# Patient Record
Sex: MALE | Race: BLACK OR AFRICAN AMERICAN | NOT HISPANIC OR LATINO | Employment: FULL TIME | URBAN - METROPOLITAN AREA
[De-identification: names, ages, dates, MRNs, and addresses within clinical notes are randomized per-mention and may not be internally consistent; named-entity substitution may affect disease eponyms.]

---

## 2024-01-09 DIAGNOSIS — E10.65 TYPE 1 DIABETES MELLITUS WITH HYPERGLYCEMIA (MULTI): ICD-10-CM

## 2024-01-10 RX ORDER — INSULIN LISPRO 100 [IU]/ML
INJECTION, SOLUTION INTRAVENOUS; SUBCUTANEOUS
Qty: 36 ML | Refills: 1 | Status: SHIPPED | OUTPATIENT
Start: 2024-01-10 | End: 2024-03-29 | Stop reason: SDUPTHER

## 2024-01-28 ENCOUNTER — CLINICAL SUPPORT (OUTPATIENT)
Dept: EMERGENCY MEDICINE | Facility: HOSPITAL | Age: 24
End: 2024-01-28
Payer: COMMERCIAL

## 2024-01-28 ENCOUNTER — HOSPITAL ENCOUNTER (EMERGENCY)
Facility: HOSPITAL | Age: 24
Discharge: HOME | End: 2024-01-28
Attending: EMERGENCY MEDICINE
Payer: COMMERCIAL

## 2024-01-28 ENCOUNTER — APPOINTMENT (OUTPATIENT)
Dept: RADIOLOGY | Facility: HOSPITAL | Age: 24
End: 2024-01-28
Payer: COMMERCIAL

## 2024-01-28 VITALS
SYSTOLIC BLOOD PRESSURE: 131 MMHG | HEIGHT: 74 IN | DIASTOLIC BLOOD PRESSURE: 81 MMHG | BODY MASS INDEX: 21.17 KG/M2 | WEIGHT: 165 LBS | HEART RATE: 77 BPM | OXYGEN SATURATION: 100 % | RESPIRATION RATE: 16 BRPM

## 2024-01-28 DIAGNOSIS — R07.89 CHEST DISCOMFORT: Primary | ICD-10-CM

## 2024-01-28 LAB
ALBUMIN SERPL BCP-MCNC: 3.7 G/DL (ref 3.4–5)
ALP SERPL-CCNC: 63 U/L (ref 33–120)
ALT SERPL W P-5'-P-CCNC: 11 U/L (ref 10–52)
ANION GAP BLDV CALCULATED.4IONS-SCNC: 7 MMOL/L (ref 10–25)
ANION GAP SERPL CALC-SCNC: 11 MMOL/L (ref 10–20)
AST SERPL W P-5'-P-CCNC: 18 U/L (ref 9–39)
ATRIAL RATE: 66 BPM
BASE EXCESS BLDV CALC-SCNC: 5.3 MMOL/L (ref -2–3)
BASOPHILS # BLD AUTO: 0.02 X10*3/UL (ref 0–0.1)
BASOPHILS NFR BLD AUTO: 0.7 %
BILIRUB SERPL-MCNC: 0.6 MG/DL (ref 0–1.2)
BODY TEMPERATURE: 37 DEGREES CELSIUS
BUN SERPL-MCNC: 15 MG/DL (ref 6–23)
CA-I BLDV-SCNC: 1.2 MMOL/L (ref 1.1–1.33)
CALCIUM SERPL-MCNC: 9.2 MG/DL (ref 8.6–10.6)
CARDIAC TROPONIN I PNL SERPL HS: <3 NG/L (ref 0–53)
CHLORIDE BLDV-SCNC: 104 MMOL/L (ref 98–107)
CHLORIDE SERPL-SCNC: 104 MMOL/L (ref 98–107)
CO2 SERPL-SCNC: 27 MMOL/L (ref 21–32)
CREAT SERPL-MCNC: 0.69 MG/DL (ref 0.5–1.3)
D DIMER PPP FEU-MCNC: 509 NG/ML FEU
EGFRCR SERPLBLD CKD-EPI 2021: >90 ML/MIN/1.73M*2
EOSINOPHIL # BLD AUTO: 0.03 X10*3/UL (ref 0–0.7)
EOSINOPHIL NFR BLD AUTO: 1.1 %
ERYTHROCYTE [DISTWIDTH] IN BLOOD BY AUTOMATED COUNT: 11 % (ref 11.5–14.5)
GLUCOSE BLDV-MCNC: 165 MG/DL (ref 74–99)
GLUCOSE SERPL-MCNC: 152 MG/DL (ref 74–99)
HCO3 BLDV-SCNC: 29.9 MMOL/L (ref 22–26)
HCT VFR BLD AUTO: 33.3 % (ref 41–52)
HCT VFR BLD EST: 35 % (ref 41–52)
HGB BLD-MCNC: 11.5 G/DL (ref 13.5–17.5)
HGB BLDV-MCNC: 11.8 G/DL (ref 13.5–17.5)
IMM GRANULOCYTES # BLD AUTO: 0.02 X10*3/UL (ref 0–0.7)
IMM GRANULOCYTES NFR BLD AUTO: 0.7 % (ref 0–0.9)
INHALED O2 CONCENTRATION: 21 %
LACTATE BLDV-SCNC: 1.5 MMOL/L (ref 0.4–2)
LYMPHOCYTES # BLD AUTO: 1.43 X10*3/UL (ref 1.2–4.8)
LYMPHOCYTES NFR BLD AUTO: 50.5 %
MCH RBC QN AUTO: 28.6 PG (ref 26–34)
MCHC RBC AUTO-ENTMCNC: 34.5 G/DL (ref 32–36)
MCV RBC AUTO: 83 FL (ref 80–100)
MONOCYTES # BLD AUTO: 0.31 X10*3/UL (ref 0.1–1)
MONOCYTES NFR BLD AUTO: 11 %
NEUTROPHILS # BLD AUTO: 1.02 X10*3/UL (ref 1.2–7.7)
NEUTROPHILS NFR BLD AUTO: 36 %
NRBC BLD-RTO: 0 /100 WBCS (ref 0–0)
OXYHGB MFR BLDV: 82.9 % (ref 45–75)
P AXIS: 87 DEGREES
P OFFSET: 188 MS
P ONSET: 134 MS
PCO2 BLDV: 43 MM HG (ref 41–51)
PH BLDV: 7.45 PH (ref 7.33–7.43)
PLATELET # BLD AUTO: 213 X10*3/UL (ref 150–450)
PO2 BLDV: 53 MM HG (ref 35–45)
POTASSIUM BLDV-SCNC: 4.5 MMOL/L (ref 3.5–5.3)
POTASSIUM SERPL-SCNC: 4.1 MMOL/L (ref 3.5–5.3)
PR INTERVAL: 168 MS
PROT SERPL-MCNC: 6.4 G/DL (ref 6.4–8.2)
Q ONSET: 218 MS
QRS COUNT: 11 BEATS
QRS DURATION: 110 MS
QT INTERVAL: 386 MS
QTC CALCULATION(BAZETT): 404 MS
QTC FREDERICIA: 398 MS
R AXIS: 75 DEGREES
RBC # BLD AUTO: 4.02 X10*6/UL (ref 4.5–5.9)
SAO2 % BLDV: 85 % (ref 45–75)
SODIUM BLDV-SCNC: 136 MMOL/L (ref 136–145)
SODIUM SERPL-SCNC: 138 MMOL/L (ref 136–145)
T AXIS: 18 DEGREES
T OFFSET: 411 MS
VENTRICULAR RATE: 66 BPM
WBC # BLD AUTO: 2.8 X10*3/UL (ref 4.4–11.3)

## 2024-01-28 PROCEDURE — 93010 ELECTROCARDIOGRAM REPORT: CPT | Performed by: EMERGENCY MEDICINE

## 2024-01-28 PROCEDURE — 85025 COMPLETE CBC W/AUTO DIFF WBC: CPT | Performed by: EMERGENCY MEDICINE

## 2024-01-28 PROCEDURE — 99284 EMERGENCY DEPT VISIT MOD MDM: CPT | Performed by: EMERGENCY MEDICINE

## 2024-01-28 PROCEDURE — 2500000004 HC RX 250 GENERAL PHARMACY W/ HCPCS (ALT 636 FOR OP/ED): Performed by: EMERGENCY MEDICINE

## 2024-01-28 PROCEDURE — 93005 ELECTROCARDIOGRAM TRACING: CPT

## 2024-01-28 PROCEDURE — 99285 EMERGENCY DEPT VISIT HI MDM: CPT | Performed by: EMERGENCY MEDICINE

## 2024-01-28 PROCEDURE — 71046 X-RAY EXAM CHEST 2 VIEWS: CPT | Mod: FOREIGN READ | Performed by: RADIOLOGY

## 2024-01-28 PROCEDURE — 84484 ASSAY OF TROPONIN QUANT: CPT | Performed by: EMERGENCY MEDICINE

## 2024-01-28 PROCEDURE — 84132 ASSAY OF SERUM POTASSIUM: CPT | Performed by: EMERGENCY MEDICINE

## 2024-01-28 PROCEDURE — 71046 X-RAY EXAM CHEST 2 VIEWS: CPT | Mod: FR

## 2024-01-28 PROCEDURE — 36415 COLL VENOUS BLD VENIPUNCTURE: CPT | Performed by: EMERGENCY MEDICINE

## 2024-01-28 PROCEDURE — 85379 FIBRIN DEGRADATION QUANT: CPT | Performed by: EMERGENCY MEDICINE

## 2024-01-28 PROCEDURE — 99284 EMERGENCY DEPT VISIT MOD MDM: CPT | Mod: 25

## 2024-01-28 RX ORDER — ACETAMINOPHEN 325 MG/1
650 TABLET ORAL ONCE
Status: COMPLETED | OUTPATIENT
Start: 2024-01-28 | End: 2024-01-28

## 2024-01-28 RX ADMIN — ACETAMINOPHEN 650 MG: 325 TABLET ORAL at 09:53

## 2024-01-28 RX ADMIN — SODIUM CHLORIDE, POTASSIUM CHLORIDE, SODIUM LACTATE AND CALCIUM CHLORIDE 1000 ML: 600; 310; 30; 20 INJECTION, SOLUTION INTRAVENOUS at 09:40

## 2024-01-28 ASSESSMENT — COLUMBIA-SUICIDE SEVERITY RATING SCALE - C-SSRS
1. IN THE PAST MONTH, HAVE YOU WISHED YOU WERE DEAD OR WISHED YOU COULD GO TO SLEEP AND NOT WAKE UP?: NO
2. HAVE YOU ACTUALLY HAD ANY THOUGHTS OF KILLING YOURSELF?: NO
6. HAVE YOU EVER DONE ANYTHING, STARTED TO DO ANYTHING, OR PREPARED TO DO ANYTHING TO END YOUR LIFE?: NO

## 2024-01-28 ASSESSMENT — PAIN DESCRIPTION - DESCRIPTORS: DESCRIPTORS: DULL;ACHING

## 2024-01-28 NOTE — DISCHARGE INSTRUCTIONS
Follow the printed patient instruction and reach out to your primary care physician within 48 hours regarding your ED visit.

## 2024-01-28 NOTE — ED PROVIDER NOTES
HPI   Chief Complaint   Patient presents with    Shortness of Breath       HPI  Oskar is a 23 year old male who was brought to the ED by the EMS with complaint of shortness of breath.  PMH is notable for T1DM, asthma, migraine, and dizziness. He states that the dizziness comes and goes. Patient reports mild headache, nausea, and sudden onset blurred vision with syncopal episode. He states no previous episodes which prompted ED visit.  He endorses vaping, denies tobacco, alcohol or drug use. He denies any recent fevers, chills, vomiting. The patient denies chest pain, abdominal pain, or urinary symptoms.                Tigre Coma Scale Score: 15                  Patient History   Past Medical History:   Diagnosis Date    Personal history of other diseases of the respiratory system     History of asthma    Personal history of other endocrine, nutritional and metabolic disease     History of diabetes with ketoacidosis    Sprain of unspecified ligament of right ankle, initial encounter 06/22/2015    Right ankle sprain     No past surgical history on file.  No family history on file.  Social History     Tobacco Use    Smoking status: Not on file    Smokeless tobacco: Not on file   Substance Use Topics    Alcohol use: Not on file    Drug use: Not on file       Physical Exam   ED Triage Vitals [01/28/24 0849]   Temp Heart Rate Respirations BP   -- 77 16 131/81      Pulse Ox Temp src Heart Rate Source Patient Position   100 % -- -- --      BP Location FiO2 (%)     -- --       Physical Exam  Vitals reviewed.   Constitutional:       General: He is not in acute distress.     Appearance: He is well-developed and normal weight. He is not ill-appearing or toxic-appearing.      Interventions: He is not intubated.  HENT:      Head: Normocephalic and atraumatic.   Eyes:      Extraocular Movements: Extraocular movements intact.      Pupils: Pupils are equal, round, and reactive to light.   Neck:      Vascular: No JVD.    Cardiovascular:      Rate and Rhythm: Normal rate and regular rhythm. No extrasystoles are present.     Pulses: Normal pulses. No decreased pulses.      Heart sounds: Normal heart sounds. No murmur heard.     No friction rub. No gallop.   Pulmonary:      Effort: Pulmonary effort is normal. No tachypnea, bradypnea, accessory muscle usage or respiratory distress. He is not intubated.      Breath sounds: Normal breath sounds. No stridor. No decreased breath sounds, wheezing, rhonchi or rales.   Chest:      Chest wall: No mass, deformity, tenderness, crepitus or edema.   Abdominal:      General: Bowel sounds are normal.      Palpations: Abdomen is soft. There is no mass.      Tenderness: There is no abdominal tenderness. There is no guarding or rebound.   Musculoskeletal:         General: Normal range of motion.      Cervical back: Normal range of motion and neck supple.      Right lower leg: No tenderness. No edema.      Left lower leg: No tenderness. No edema.   Skin:     General: Skin is warm.      Coloration: Skin is not cyanotic or pale.      Findings: No ecchymosis, erythema or rash.   Neurological:      General: No focal deficit present.      Mental Status: He is alert and oriented to person, place, and time.      Cranial Nerves: No cranial nerve deficit.      Motor: No weakness.   Psychiatric:         Mood and Affect: Mood normal. Mood is not anxious.         Behavior: Behavior normal. Behavior is not agitated.       ED Course & MDM   ED Course as of 01/28/24 1239   Sun Jan 28, 2024   0925 ECG with a sinus rhythm at a rate of 71 with normal axis and intervals, normal ST segments although upsloping in lead II without any reciprocal changes.  Also upsloping in V4 and V5.  Overall nonacute EKG.  Plan to repeat in 1 hour. [YT]   1028 Repeat EKG with a normal sinus rhythm, rate of 66, normal axis and intervals, ST segments in V3 V4 and V5 with similar appearance.  There is no reciprocal changes.  Overall nonacute  acute/ischemic EKG [YT]      ED Course User Index  [YT] Evi Olson MD         Diagnoses as of 01/28/24 1239   Chest discomfort       Medical Decision Making  23-year-old male history of T1DM with poor lysing control presents emergency department due to concern for collection of symptoms including shortness of breath, headache, transient and resolved blurred vision. Patient has no evidence of acidosis, based on venous blood gas. Given concern for patient's syncopal episode, chest x-ray, EKG, and cardiac troponin pursued to rule out acute cardiopulmonary event.  The patient received a dose of Tylenol for chest discomfort. Patient given IV fluids, and PE assessment performed.  Chest x-ray is negative for acute finding.  CBC result shows leukopenia, and mild anemia with H&H of 11.5/ 33.3.  Chemistry panel shows hyperglycemia, glucose of 152 without kidney injury or electrolyte abnormality.  Lab without shows troponin within normal ranges, ruling out any acute cardiac event.  D-dimer is mildly elevated without symptoms suggesting pulmonary embolism, ruling out further evaluation for pulmonary embolism.    The patient was seen and evaluated at the bedside by the ED attending, Dr. Olson. On reassessment patient is resting comfortably, in no acute distress. He endorses improvement in his symptoms.  Labs and imaging result was explained to the patient, and he feels reassured about the negative findings.  Patient maintained normal vital signs during the ED course, and was cleared to be discharged by the ED team.  The decision to discharge was communicated to the patient, and he is comfortable with plan for discharge with close return precautions.    The patient was discharged in a stable condition with instructions to follow-up with his primary care physician regarding his ED visit.        Procedure  Procedures     Rico Ramirez MD  Resident  01/28/24 1150       Rico Ramirez MD  Resident  01/28/24 1239

## 2024-01-28 NOTE — PROGRESS NOTES
Mr. Haines is a 23-year-old gentleman with a history of diabetes who presents to the emergency department after a syncopal episode.  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  He was feeling completely fine yesterday.  But today he started feeling very warm that And felt a little bit sweaty.  He had to keep the windows open for air.  When he got back to his base he was talking to one of his colleagues and felt dizzy and lightheaded.  The next thing he remembers is trying to get up from the floor.  At that time he had a headache but has been improving.  He also notes some chest pain and shortness of breath at this time.  He mostly feels like he cannot take a full deep breath from his left lobe like it is pushing up.  He states that he ate his regular breakfast this morning.  He denies any other focal complaints.  On exam, patient is very well-appearing nontoxic  Skin: It is dry but cool  HEENT exam: Normocephalic atraumatic conjunctiva is pink oropharynx is clear with moist mucous membranes  Cardiovascular: Regular rate and rhythm  Pulmonary: Clear to auscultation bilaterally  Abdomen: Soft and nontender without any rebound or guarding  Musculoskeletal: No spinal tenderness, no long bony tenderness.    Assessment and plan: This is a young gentleman with a history of diabetes who presents with syncope.  Symptoms are most consistent with a vasovagal syncope although with his chest pain and shortness of breath it behooves us to evaluate for cardiac issues.  At this time given lack of symptomatology a further trauma workup is not necessary.  Patient may have some degree of dehydration given his diaphoresis earlier today and will be prescribed fluids as well.  Tylenol for other resolving symptoms.

## 2024-01-28 NOTE — ED TRIAGE NOTES
"Pt came in via [facility], per pt he was at work and passed out, + LOC, denies hitting his head, pt complains of SOB, pt coworkers noticed he was acting \"out of it\" and called 911 pt has pmh DM and asthma, per EMS   "

## 2024-02-14 ENCOUNTER — APPOINTMENT (OUTPATIENT)
Dept: ENDOCRINOLOGY | Facility: CLINIC | Age: 24
End: 2024-02-14
Payer: COMMERCIAL

## 2024-03-29 DIAGNOSIS — E10.65 TYPE 1 DIABETES MELLITUS WITH HYPERGLYCEMIA (MULTI): ICD-10-CM

## 2024-03-30 RX ORDER — INSULIN LISPRO 100 [IU]/ML
INJECTION, SOLUTION INTRAVENOUS; SUBCUTANEOUS
Qty: 36 ML | Refills: 1 | Status: SHIPPED | OUTPATIENT
Start: 2024-03-30

## 2024-10-11 ENCOUNTER — APPOINTMENT (OUTPATIENT)
Dept: ENDOCRINOLOGY | Facility: HOSPITAL | Age: 24
End: 2024-10-11
Payer: COMMERCIAL

## 2024-11-27 ENCOUNTER — APPOINTMENT (OUTPATIENT)
Dept: ENDOCRINOLOGY | Facility: CLINIC | Age: 24
End: 2024-11-27
Payer: COMMERCIAL

## 2024-11-27 ENCOUNTER — LAB (OUTPATIENT)
Dept: LAB | Facility: LAB | Age: 24
End: 2024-11-27
Payer: COMMERCIAL

## 2024-11-27 VITALS
WEIGHT: 196 LBS | BODY MASS INDEX: 25.15 KG/M2 | SYSTOLIC BLOOD PRESSURE: 123 MMHG | HEART RATE: 72 BPM | HEIGHT: 74 IN | DIASTOLIC BLOOD PRESSURE: 75 MMHG

## 2024-11-27 DIAGNOSIS — E10.29 TYPE 1 DIABETES MELLITUS WITH DIABETIC MICROALBUMINURIA (MULTI): ICD-10-CM

## 2024-11-27 DIAGNOSIS — E10.29 TYPE 1 DIABETES MELLITUS WITH DIABETIC MICROALBUMINURIA (MULTI): Primary | ICD-10-CM

## 2024-11-27 DIAGNOSIS — R80.9 TYPE 1 DIABETES MELLITUS WITH DIABETIC MICROALBUMINURIA (MULTI): Primary | ICD-10-CM

## 2024-11-27 DIAGNOSIS — R80.9 TYPE 1 DIABETES MELLITUS WITH DIABETIC MICROALBUMINURIA (MULTI): ICD-10-CM

## 2024-11-27 LAB
ALBUMIN SERPL BCP-MCNC: 4.6 G/DL (ref 3.4–5)
ANION GAP SERPL CALC-SCNC: 11 MMOL/L (ref 10–20)
BUN SERPL-MCNC: 13 MG/DL (ref 6–23)
CALCIUM SERPL-MCNC: 10.1 MG/DL (ref 8.6–10.6)
CHLORIDE SERPL-SCNC: 105 MMOL/L (ref 98–107)
CHOLEST SERPL-MCNC: 165 MG/DL (ref 0–199)
CHOLESTEROL/HDL RATIO: 2.4
CO2 SERPL-SCNC: 30 MMOL/L (ref 21–32)
CREAT SERPL-MCNC: 0.88 MG/DL (ref 0.5–1.3)
CREAT UR-MCNC: 102.5 MG/DL (ref 20–370)
EGFRCR SERPLBLD CKD-EPI 2021: >90 ML/MIN/1.73M*2
EST. AVERAGE GLUCOSE BLD GHB EST-MCNC: 192 MG/DL
GLUCOSE SERPL-MCNC: 85 MG/DL (ref 74–99)
HBA1C MFR BLD: 8.3 %
HDLC SERPL-MCNC: 67.7 MG/DL
LDLC SERPL CALC-MCNC: 87 MG/DL
MICROALBUMIN UR-MCNC: <7 MG/L
MICROALBUMIN/CREAT UR: NORMAL MG/G{CREAT}
NON HDL CHOLESTEROL: 97 MG/DL (ref 0–149)
PHOSPHATE SERPL-MCNC: 4.1 MG/DL (ref 2.5–4.9)
POTASSIUM SERPL-SCNC: 4.7 MMOL/L (ref 3.5–5.3)
SODIUM SERPL-SCNC: 141 MMOL/L (ref 136–145)
TRIGL SERPL-MCNC: 52 MG/DL (ref 0–149)
TSH SERPL-ACNC: 2.04 MIU/L (ref 0.44–3.98)
VLDL: 10 MG/DL (ref 0–40)

## 2024-11-27 PROCEDURE — 3062F POS MACROALBUMINURIA REV: CPT | Performed by: STUDENT IN AN ORGANIZED HEALTH CARE EDUCATION/TRAINING PROGRAM

## 2024-11-27 PROCEDURE — 99215 OFFICE O/P EST HI 40 MIN: CPT | Performed by: STUDENT IN AN ORGANIZED HEALTH CARE EDUCATION/TRAINING PROGRAM

## 2024-11-27 PROCEDURE — 84443 ASSAY THYROID STIM HORMONE: CPT

## 2024-11-27 PROCEDURE — 82570 ASSAY OF URINE CREATININE: CPT

## 2024-11-27 PROCEDURE — 3078F DIAST BP <80 MM HG: CPT | Performed by: STUDENT IN AN ORGANIZED HEALTH CARE EDUCATION/TRAINING PROGRAM

## 2024-11-27 PROCEDURE — 3074F SYST BP LT 130 MM HG: CPT | Performed by: STUDENT IN AN ORGANIZED HEALTH CARE EDUCATION/TRAINING PROGRAM

## 2024-11-27 PROCEDURE — 80069 RENAL FUNCTION PANEL: CPT

## 2024-11-27 PROCEDURE — 80061 LIPID PANEL: CPT

## 2024-11-27 PROCEDURE — 83036 HEMOGLOBIN GLYCOSYLATED A1C: CPT

## 2024-11-27 PROCEDURE — 3048F LDL-C <100 MG/DL: CPT | Performed by: STUDENT IN AN ORGANIZED HEALTH CARE EDUCATION/TRAINING PROGRAM

## 2024-11-27 PROCEDURE — 82043 UR ALBUMIN QUANTITATIVE: CPT

## 2024-11-27 PROCEDURE — 3008F BODY MASS INDEX DOCD: CPT | Performed by: STUDENT IN AN ORGANIZED HEALTH CARE EDUCATION/TRAINING PROGRAM

## 2024-11-27 PROCEDURE — 95251 CONT GLUC MNTR ANALYSIS I&R: CPT | Performed by: STUDENT IN AN ORGANIZED HEALTH CARE EDUCATION/TRAINING PROGRAM

## 2024-11-27 PROCEDURE — 3052F HG A1C>EQUAL 8.0%<EQUAL 9.0%: CPT | Performed by: STUDENT IN AN ORGANIZED HEALTH CARE EDUCATION/TRAINING PROGRAM

## 2024-11-27 PROCEDURE — 36415 COLL VENOUS BLD VENIPUNCTURE: CPT

## 2024-11-27 RX ORDER — BLOOD SUGAR DIAGNOSTIC
STRIP MISCELLANEOUS
COMMUNITY
Start: 2024-01-23

## 2024-11-27 RX ORDER — LORATADINE 10 MG/1
1 TABLET ORAL DAILY PRN
COMMUNITY
Start: 2015-06-30

## 2024-11-27 RX ORDER — BIOTIN 100 %
POWDER (GRAM) MISCELLANEOUS
COMMUNITY
Start: 2016-03-03

## 2024-11-27 RX ORDER — FLUTICASONE PROPIONATE 50 MCG
2 SPRAY, SUSPENSION (ML) NASAL DAILY
COMMUNITY
Start: 2020-01-16

## 2024-11-27 RX ORDER — BLOOD-GLUCOSE TRANSMITTER
EACH MISCELLANEOUS
COMMUNITY
Start: 2024-08-19

## 2024-11-27 RX ORDER — BLOOD-GLUCOSE SENSOR
EACH MISCELLANEOUS
COMMUNITY
Start: 2024-10-25

## 2024-11-27 RX ORDER — GLUCAGON 3 MG/1
POWDER NASAL AS NEEDED
COMMUNITY
Start: 2020-04-13

## 2024-11-27 RX ORDER — IBUPROFEN 200 MG
TABLET ORAL
COMMUNITY
Start: 2016-11-02

## 2024-11-27 RX ORDER — INSULIN GLARGINE 100 [IU]/ML
36 INJECTION, SOLUTION SUBCUTANEOUS
COMMUNITY
Start: 2022-04-25

## 2024-11-27 RX ORDER — ALBUTEROL SULFATE 90 UG/1
2 INHALANT RESPIRATORY (INHALATION)
COMMUNITY
Start: 2013-10-25

## 2024-11-27 NOTE — PROGRESS NOTES
24 y.o patient with PMHx of T1DM, had a hx of asthma, works second shift here for evaluation of hisdiabetes.      Background:  Dx: at the age of 11 , doesn't recall A1c level at Dx  -Last HbA1c 8.4% 8/2024, from 11% in jan 2024  -Endocrinologist: KELSEY Garcia  -Previous treatment for diabetes: Insulin, Omnipod (came off of it around 10/2023 due to insurance coverage issue, used it for about 3 years)  -Current home regimen:   Lantus 35 units  at noon , fixed  Humulog SSI ISF: 1:30 > 150 , ICR: 1:15 (usually 7-10 units premeals)  Injects insulin sometimes right before or during meals  Sometimes would injects insulin greta elavted BG despite not eating.  -Compliance:  Admits to missing 1/ dose of humulog during the day, never misses Lantus  -Injection sites:  Legs , stomach, arms  Patient is aware of the importance of switching and alternating sides and sites  -Accu-Cheks:   FSBG: occasionally, has glucometer O[facility]   CGM: Dexcom G6 q 10 days  -Hypoglycemia:  +  Frequency: twice a week  Timing: early morning  Threshold:  70-80 and below  Never had to use third-party for severe hypoglycemia.  Sugar tablets:+, uses juice, candies  NO Baqsimi:-  NO Glucagon injection:-  -Hyperglycemia: denies blurry vision, polyuria , polydipsia  -Diet: Carb counting  Meals: 2/ meals lunch (noon) and Dinner (7PM), fast food  Snacks: rarely, cake  Seen by nutrition specialist  -Complications:  DKA requiring hospitalization:~9/9/2020.   Macrovascular: No CAD, no PAD, no TIA  Microvascular:  No known retinopathy (last eye exam 10/2024 at [facility]), neuropathy (never been to podiatry) , + nephropathy  (last ACR 40.5 2020)            Past Medical History:   Diagnosis Date    Personal history of other diseases of the respiratory system     History of asthma    Personal history of other endocrine, nutritional and metabolic disease     History of diabetes with ketoacidosis    Sprain of unspecified ligament of right ankle,  "initial encounter 06/22/2015    Right ankle sprain     No past surgical history on file.     Social History     Tobacco Use    Smoking status: Former     Types: Cigarettes     Passive exposure: Never    Smokeless tobacco: Never      No family history on file.   Family hx : T2DM, cataracts    Allergies   Allergen Reactions    Peach Anaphylaxis    Penicillins Hives, Itching and Unknown    House Dust Other    Tree And Shrub Pollen Unknown      Current Outpatient Medications on File Prior to Visit   Medication Sig Dispense Refill    albuterol (ProAir HFA) 90 mcg/actuation inhaler Inhale 2 puffs every 4 hours.      biotin 100 % powder Biotin Powder USE AS DIRECTED. Refills: 0 Start : 3-Mar-2016 Active      Dexcom G6 Sensor device       Dexcom G6 Transmitter device       fluticasone (Flonase) 50 mcg/actuation nasal spray Administer 2 sprays into affected nostril(s) once daily.      FreeStyle Precision Piero Strips strip Use as instructed to test blood sugar 4 x per day.      glucagon (Baqsimi) 3 mg/actuation spray,non-aerosol Administer into affected nostril(s) if needed.      glucose 4 gram chewable tablet Chew.      insulin lispro (HumaLOG KwikPen Insulin) 100 unit/mL injection PLEASE INJECT AS PER CARB RATIO AND SLIDING SCALE BEFORE EACH MEAL AND 1:50 SLIDING SCALE AT BEDTIME, EXPECT UP TO 40 UNITS TOTAL DAILY 36 mL 1    Lantus Solostar U-100 Insulin 100 unit/mL (3 mL) pen Inject 36 Units under the skin once daily. (Patient taking differently: Inject 35 Units under the skin once daily.)      loratadine (Claritin) 10 mg tablet Take 1 tablet (10 mg) by mouth once daily as needed.       No current facility-administered medications on file prior to visit.        Visit Vitals  /75 (BP Location: Right arm, Patient Position: Sitting, BP Cuff Size: Large adult)   Pulse 72   Ht 1.88 m (6' 2\")   Wt 88.9 kg (196 lb)   BMI 25.16 kg/m²   Smoking Status Former   BSA 2.15 m²        PE:  Constitutional: NAD, AOx3.  Skin/Hair: " Warm, dry skin.  HEENT: EOMI, Anicteric scleras, No lid lag or lid retraction, No Chemosis, No TTP of ocular globes. Dry oral mucosa. Chvostek sign +  Neck: Soft, supple. Non tender, full ROM, no lymphadenopathy. Thyroid gland palpation: + enlarged (right lobe?), soft consistency, non tender, no bruit.   Cardiovascular: Normal s1s2, RRR, no rub or murmurs.  Respiratory: CTAB, GBAE, no wheezes  Abdomen: +BS, Soft, non-tender to palpation,no lipo hypertrophy, Dexcom G6 over left lower quadrant  Extremities: weak PT pulses, No peripheral edema, No tremors  Neuro: Moving all extremities spontaneously. CN's grossly intact. No balance or gait disturbances. DTRs: no delay in relaxation phase.      Labs:  Lab Results   Component Value Date    HGBA1C 8.4 (H) 08/28/2024    HGBA1C 11.0 (H) 01/23/2024    HGBA1C 9.0 (A) 10/06/2022     01/28/2024    K 4.1 01/28/2024     01/28/2024    CO2 27 01/28/2024    BUN 15 01/28/2024    CREATININE 0.69 01/28/2024    CALCIUM 9.2 01/28/2024    ALBUMIN 3.7 01/28/2024    PROT 6.4 01/28/2024    BILITOT 0.6 01/28/2024    ALKPHOS 63 01/28/2024    ALT 11 01/28/2024    AST 18 01/28/2024    GLUCOSE 152 (H) 01/28/2024    CHOL 143 02/07/2019    TRIG 45 02/07/2019    HDL 61.8 02/07/2019    BHYDRXBUT 3.19 (H) 09/09/2020          Assessment/plan:     24 y.o patient with PMHx of T1DM, had a hx of asthma, works second shift here for evaluation of his diabetes.    Poorly controlled with significant BG drop    -Lantus increase dose to 38 units daily if waking up with BG > 200  -Continue same ICR: 1:15  -Adjust ISF 1:40  -Fasting blood work including ACR, TSH (enlarged right thyroid lobe?)  -Follow up with clinical pharm in 2 weeks  -RTC in 4 months , consider insulin pump placement    Chloe Wooten MD  PGY-V, Endocrinology fellow  Patient seen , examined and case discussed with Dr. Turner

## 2025-01-02 ENCOUNTER — APPOINTMENT (OUTPATIENT)
Dept: PHARMACY | Facility: HOSPITAL | Age: 25
End: 2025-01-02
Payer: COMMERCIAL

## 2025-01-02 DIAGNOSIS — R80.9 TYPE 1 DIABETES MELLITUS WITH DIABETIC MICROALBUMINURIA (MULTI): ICD-10-CM

## 2025-01-02 DIAGNOSIS — E10.29 TYPE 1 DIABETES MELLITUS WITH DIABETIC MICROALBUMINURIA (MULTI): ICD-10-CM

## 2025-01-02 RX ORDER — INSULIN PMP CART,AUT,G6/7,CNTR
1 EACH SUBCUTANEOUS ONCE
Qty: 1 EACH | Refills: 0 | Status: SHIPPED | OUTPATIENT
Start: 2025-01-02 | End: 2025-01-04

## 2025-01-02 NOTE — PROGRESS NOTES
Clinical Pharmacy Team contacted Oskar Haines regarding a consultation for diabetes management thanks to a referral from . Below is a summary of our conversation and recommendations:      ________________________________________________________________________      Allergies   Allergen Reactions    Peach Anaphylaxis    Penicillins Hives, Itching and Unknown    House Dust Other    Tree And Shrub Pollen Unknown         Objective     There were no vitals taken for this visit.    Current Outpatient Medications on File Prior to Visit   Medication Sig Dispense Refill    albuterol (ProAir HFA) 90 mcg/actuation inhaler Inhale 2 puffs every 4 hours.      biotin 100 % powder Biotin Powder USE AS DIRECTED. Refills: 0 Start : 3-Mar-2016 Active      Dexcom G6 Sensor device       Dexcom G6 Transmitter device       fluticasone (Flonase) 50 mcg/actuation nasal spray Administer 2 sprays into affected nostril(s) once daily.      FreeStyle Precision Piero Strips strip Use as instructed to test blood sugar 4 x per day.      glucagon (Baqsimi) 3 mg/actuation spray,non-aerosol Administer into affected nostril(s) if needed.      glucose 4 gram chewable tablet Chew.      insulin lispro (HumaLOG KwikPen Insulin) 100 unit/mL injection PLEASE INJECT AS PER CARB RATIO AND SLIDING SCALE BEFORE EACH MEAL AND 1:50 SLIDING SCALE AT BEDTIME, EXPECT UP TO 40 UNITS TOTAL DAILY 36 mL 1    Lantus Solostar U-100 Insulin 100 unit/mL (3 mL) pen Inject 36 Units under the skin once daily. (Patient taking differently: Inject 35 Units under the skin once daily.)      loratadine (Claritin) 10 mg tablet Take 1 tablet (10 mg) by mouth once daily as needed.       No current facility-administered medications on file prior to visit.         Lab Review  Lab Results   Component Value Date    BILITOT 0.6 01/28/2024    CALCIUM 10.1 11/27/2024    CO2 30 11/27/2024     11/27/2024    CREATININE 0.88 11/27/2024    GLUCOSE 85 11/27/2024    ALKPHOS 63 01/28/2024    K  4.7 2024    PROT 6.4 2024     2024    AST 18 2024    ALT 11 2024    BUN 13 2024    ANIONGAP 11 2024    MG 1.97 2020    PHOS 4.1 2024    ALBUMIN 4.6 2024    GFRMALE >90 10/25/2022     Lab Results   Component Value Date    TRIG 52 2024    CHOL 165 2024    LDLCALC 87 2024    HDL 67.7 2024     Lab Results   Component Value Date    HGBA1C 8.3 (H) 2024    HGBA1C 8.4 (H) 2024    HGBA1C 11.0 (H) 2024     The ASCVD Risk score (Daniel ERWIN, et al., 2019) failed to calculate for the following reasons:    The 2019 ASCVD risk score is only valid for ages 40 to 79      Monitoring         Assessment/Plan     The patient reports today for a diabetes consultation. Reviewed CGM report and discussed with patient. TIR not at goal. CGM report suggests hyperglycemia throughout the entire day. Patient does report missing doeses for both rapid and basal insulin. Patient reports being on omnipod 5 about 1-2 years ago. States he lost insurance coverage and was not able to continue the pump. He would benefit from insulin pump therapy and AID software. Currently carb counts and is using a dexcom CGM which is compatible. Will look into insurance coverage.         PATIENT EDUCATION/GOALS    Goals  Fasting B - 130 mg/dL  Postprandial BG: less than 180 mg/dL  A1c: less than 7%      Provided counseling on lifestyle modifications, medications, and self-monitoring. Patient has no additional questions at this time. Pharmacy to follow up in 2-3 weeks. Please reach out with any questions. Thank you.       Jacinto Quiroz, PharmD    Continue all meds under the continuation of care with the referring provider and clinical pharmacy team.

## 2025-03-31 ENCOUNTER — APPOINTMENT (OUTPATIENT)
Dept: ENDOCRINOLOGY | Facility: CLINIC | Age: 25
End: 2025-03-31
Payer: COMMERCIAL

## 2025-03-31 VITALS
DIASTOLIC BLOOD PRESSURE: 89 MMHG | WEIGHT: 194 LBS | SYSTOLIC BLOOD PRESSURE: 142 MMHG | BODY MASS INDEX: 24.12 KG/M2 | HEART RATE: 72 BPM | HEIGHT: 75 IN

## 2025-03-31 DIAGNOSIS — R80.9 TYPE 1 DIABETES MELLITUS WITH DIABETIC MICROALBUMINURIA (MULTI): Primary | ICD-10-CM

## 2025-03-31 DIAGNOSIS — E10.29 TYPE 1 DIABETES MELLITUS WITH DIABETIC MICROALBUMINURIA (MULTI): Primary | ICD-10-CM

## 2025-03-31 LAB — POC HEMOGLOBIN A1C: 12 % (ref 4.2–6.5)

## 2025-03-31 PROCEDURE — G2211 COMPLEX E/M VISIT ADD ON: HCPCS | Performed by: STUDENT IN AN ORGANIZED HEALTH CARE EDUCATION/TRAINING PROGRAM

## 2025-03-31 PROCEDURE — 3079F DIAST BP 80-89 MM HG: CPT | Performed by: STUDENT IN AN ORGANIZED HEALTH CARE EDUCATION/TRAINING PROGRAM

## 2025-03-31 PROCEDURE — 3077F SYST BP >= 140 MM HG: CPT | Performed by: STUDENT IN AN ORGANIZED HEALTH CARE EDUCATION/TRAINING PROGRAM

## 2025-03-31 PROCEDURE — 3008F BODY MASS INDEX DOCD: CPT | Performed by: STUDENT IN AN ORGANIZED HEALTH CARE EDUCATION/TRAINING PROGRAM

## 2025-03-31 PROCEDURE — 83036 HEMOGLOBIN GLYCOSYLATED A1C: CPT | Performed by: STUDENT IN AN ORGANIZED HEALTH CARE EDUCATION/TRAINING PROGRAM

## 2025-03-31 PROCEDURE — 99213 OFFICE O/P EST LOW 20 MIN: CPT | Performed by: STUDENT IN AN ORGANIZED HEALTH CARE EDUCATION/TRAINING PROGRAM

## 2025-03-31 RX ORDER — INSULIN GLARGINE 100 [IU]/ML
38 INJECTION, SOLUTION SUBCUTANEOUS
Qty: 15 ML | Refills: 4 | Status: SHIPPED | OUTPATIENT
Start: 2025-03-31

## 2025-03-31 ASSESSMENT — PAIN SCALES - GENERAL: PAINLEVEL_OUTOF10: 0-NO PAIN

## 2025-03-31 NOTE — PATIENT INSTRUCTIONS
Increase lantus to 38 units daily.  Change insulin to carb to 1:14 instead of 1:15  Continue ISF 1:25  Start using DEXCOM again  We ordered the 7 if not approved I will refill the 6.  Will check about the insulin pump and insurance  Pharmacy follow up in 2 weeks    RTC in 3 months

## 2025-03-31 NOTE — PROGRESS NOTES
"Patient coming in for follow up for T1DM    Subjective   Oskar Haines is a 24 y.o. male who presents for follow up for Type 1 diabetes mellitus.       24 y.o patient with PMHx of T1DM, had a hx of asthma, works second shift here for follow up  Lab Results   Component Value Date    HGBA1C 8.3 (H) 11/27/2024           Background:  Dx: at the age of 11 , doesn't recall A1c level at Dx  -Last HbA1c 12%  -Previous treatment for diabetes: Insulin, Omnipod (came off of it around 10/2023 due to insurance coverage issue, used it for about 3 years)  -Current home regimen:   Lantus 33 units  at 250 , fixed  Humulog SSI ISF: 1:30 > 150 , ICR: 1:15 (usually 7-10 units premeals)  Humalog 2:30 and around 1:30 am  1st meal around 1:30-2:30 takes 10-15 units usually wrap or sub sandwich  Next meal around 1:30 burger with fries takes 10-15 units  BG very high.   In am [phone] High  Around 12-13 [phone]  3-6 pm [phone]  Bed time High  No recent low BG  -Compliance:  Admits to missing 2x dose of humulog during the day, never misses Lantus  -Injection sites:  Legs , stomach, arms  Patient is aware of the importance of switching and alternating sides and sites  Never had to use third-party for severe hypoglycemia.  Sugar tablets:+, uses juice, candies  NO Baqsimi:-  NO Glucagon injection:-  -Hyperglycemia: denies blurry vision, polyuria , polydipsia  -Diet: Carb counting  Meals: 2/ meals lunch (noon) and Dinner (7PM), fast food  Snacks: rarely, cake  Seen by nutrition specialist  -Complications:  DKA requiring hospitalization:~9/9/2020.   Macrovascular: No CAD, no PAD, no TIA  Microvascular:  No known retinopathy (last eye exam 10/2024 at [facility]), neuropathy (never been to podiatry) , + nephropathy  (last ACR 40.5 2020)     Review of Systems  all pertinent systems reviewed and are otherwise negative   Objective   /89   Pulse 72   Ht 1.905 m (6' 3\")   Wt 88 kg (194 lb)   BMI 24.25 kg/m² "   Physical Exam  Constitutional:       General: He is not in acute distress.     Appearance: Normal appearance.   HENT:      Head: Normocephalic and atraumatic.   Eyes:      Extraocular Movements: Extraocular movements intact.      Pupils: Pupils are equal, round, and reactive to light.   Cardiovascular:      Rate and Rhythm: Normal rate and regular rhythm.   Pulmonary:      Effort: Pulmonary effort is normal. No respiratory distress.      Breath sounds: Normal breath sounds.   Abdominal:      General: Bowel sounds are normal.      Palpations: Abdomen is soft.      Tenderness: There is no abdominal tenderness.   Skin:     Coloration: Skin is not jaundiced or pale.      Findings: No erythema or rash.   Neurological:      General: No focal deficit present.      Mental Status: He is alert and oriented to person, place, and time.      Deep Tendon Reflexes: Reflexes normal.   Psychiatric:         Mood and Affect: Mood normal.         Behavior: Behavior normal.         Lab Review  Glucose (mg/dL)   Date Value   11/27/2024 85   01/28/2024 152 (H)   10/25/2022 336 (H)   10/06/2022 461 (HH)   02/28/2021 186 (H)     Hemoglobin A1C (%)   Date Value   11/27/2024 8.3 (H)   08/28/2024 8.4 (H)   01/23/2024 11.0 (H)   10/06/2022 9.0 (A)   03/15/2021 9.0   09/09/2020 9.1     Bicarbonate (mmol/L)   Date Value   11/27/2024 30   01/28/2024 27   10/25/2022 21   10/06/2022 28   02/28/2021 25     Urea Nitrogen (mg/dL)   Date Value   11/27/2024 13   01/28/2024 15   10/25/2022 20   10/06/2022 21   02/28/2021 19     Creatinine (mg/dL)   Date Value   11/27/2024 0.88   01/28/2024 0.69   10/25/2022 1.10   10/06/2022 1.00   02/28/2021 0.76     Lab Results   Component Value Date    CHOL 165 11/27/2024    CHOL 143 02/07/2019    CHOL 156 03/27/2018     Lab Results   Component Value Date    HDL 67.7 11/27/2024    HDL 61.8 02/07/2019    HDL 56.2 03/27/2018     Lab Results   Component Value Date    LDLCALC 87 11/27/2024     Lab Results   Component  "Value Date    TRIG 52 11/27/2024    TRIG 45 02/07/2019    TRIG 123 03/27/2018     No components found for: \"CHOLHDL\"   Lab Results   Component Value Date    TSH 2.04 11/27/2024     Assessment/Plan   24 y.o patient with PMHx of T1DM, had a hx of asthma, works second shift here for follow up  Dx: at the age of 11 -Last HbA1c 12% today  -Previous treatment for diabetes: Insulin, Omnipod (came off of it around 10/2023 due to insurance coverage issue, used it for about 3 years)  -Current home regimen:   Lantus 33 units  at 2:50 , fixed  Humulog SSI ISF: 1:30 > 150 , ICR: 1:15 (usually 7-10 units premeals)  Humalog 2:30 and around 1:30 am  BG very high. No recent low BG  DKA requiring hospitalization:~9/9/2020.   Macrovascular: No CAD, no PAD, no TIA  Microvascular:  No known retinopathy (last eye exam 10/2024 at [facility]), neuropathy (never been to podiatry) , + nephropathy now improved checked in 2024  He would benefit from insulin pump therapy and AID software. Currently carb counts and is using a dexcom CGM which is compatible. Will look into insurance coverage.     Plan:  Increase lantus to 38 units daily.  Change insulin to carb to 1:14 instead of 1:15  Continue ISF 1:25  Start using DEXCOM again  We ordered the 7 if not approved I will refill the 6.  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

## 2025-04-01 DIAGNOSIS — R80.9 TYPE 1 DIABETES MELLITUS WITH DIABETIC MICROALBUMINURIA (MULTI): Primary | ICD-10-CM

## 2025-04-01 DIAGNOSIS — E10.29 TYPE 1 DIABETES MELLITUS WITH DIABETIC MICROALBUMINURIA (MULTI): Primary | ICD-10-CM

## 2025-04-01 RX ORDER — BLOOD-GLUCOSE,RECEIVER,CONT
EACH MISCELLANEOUS
Qty: 1 EACH | Refills: 1 | Status: SHIPPED | OUTPATIENT
Start: 2025-04-01

## 2025-04-01 RX ORDER — BLOOD-GLUCOSE SENSOR
EACH MISCELLANEOUS
Qty: 3 EACH | Refills: 11 | Status: SHIPPED | OUTPATIENT
Start: 2025-04-01

## 2025-04-02 ENCOUNTER — TELEMEDICINE (OUTPATIENT)
Dept: PHARMACY | Facility: HOSPITAL | Age: 25
End: 2025-04-02
Payer: COMMERCIAL

## 2025-04-02 DIAGNOSIS — R80.9 TYPE 1 DIABETES MELLITUS WITH DIABETIC MICROALBUMINURIA (MULTI): ICD-10-CM

## 2025-04-02 DIAGNOSIS — E10.29 TYPE 1 DIABETES MELLITUS WITH DIABETIC MICROALBUMINURIA (MULTI): ICD-10-CM

## 2025-04-04 RX ORDER — INSULIN PMP CART,AUT,G6/7,CNTR
1 EACH SUBCUTANEOUS ONCE
Qty: 1 EACH | Refills: 0 | Status: SHIPPED | OUTPATIENT
Start: 2025-04-04 | End: 2025-04-05

## 2025-04-04 NOTE — PROGRESS NOTES
Clinical Pharmacy Team contacted Oskar Yancy regarding a consultation for diabetes management thanks to a referral from . Below is a summary of our conversation and recommendations:      ________________________________________________________________________      Allergies   Allergen Reactions    Peach Anaphylaxis    Penicillins Hives, Itching and Unknown    House Dust Other    Tree And Shrub Pollen Unknown         Objective     There were no vitals taken for this visit.    Current Outpatient Medications on File Prior to Visit   Medication Sig Dispense Refill    albuterol (ProAir HFA) 90 mcg/actuation inhaler Inhale 2 puffs every 4 hours.      biotin 100 % powder Biotin Powder USE AS DIRECTED. Refills: 0 Start : 3-Mar-2016 Active      blood-glucose sensor (Dexcom G7 Sensor) device Check BG 3-4 times a day 3 each 11    blood-glucose,,cont (Dexcom G7 ) misc Use as instructed 1 each 1    Dexcom G6 Transmitter device       fluticasone (Flonase) 50 mcg/actuation nasal spray Administer 2 sprays into affected nostril(s) once daily.      FreeStyle Precision Piero Strips strip Use as instructed to test blood sugar 4 x per day.      glucagon (Baqsimi) 3 mg/actuation spray,non-aerosol Administer into affected nostril(s) if needed.      glucose 4 gram chewable tablet Chew.      insulin glargine (Lantus Solostar U-100 Insulin) 100 unit/mL (3 mL) pen Inject 38 Units under the skin once daily. 15 mL 4    insulin lispro (HumaLOG KwikPen Insulin) 100 unit/mL injection PLEASE INJECT AS PER CARB RATIO AND SLIDING SCALE BEFORE EACH MEAL AND 1:50 SLIDING SCALE AT BEDTIME, EXPECT UP TO 40 UNITS TOTAL DAILY 36 mL 1    loratadine (Claritin) 10 mg tablet Take 1 tablet (10 mg) by mouth once daily as needed.      [DISCONTINUED] Dexcom G6 Sensor device       [DISCONTINUED] Lantus Solostar U-100 Insulin 100 unit/mL (3 mL) pen Inject 36 Units under the skin once daily. (Patient taking differently: Inject 35 Units under the skin  once daily.)       No current facility-administered medications on file prior to visit.         Lab Review  Lab Results   Component Value Date    BILITOT 0.6 2024    CALCIUM 10.1 2024    CO2 30 2024     2024    CREATININE 0.88 2024    GLUCOSE 85 2024    ALKPHOS 63 2024    K 4.7 2024    PROT 6.4 2024     2024    AST 18 2024    ALT 11 2024    BUN 13 2024    ANIONGAP 11 2024    MG 1.97 2020    PHOS 4.1 2024    ALBUMIN 4.6 2024    GFRMALE >90 10/25/2022     Lab Results   Component Value Date    TRIG 52 2024    CHOL 165 2024    LDLCALC 87 2024    HDL 67.7 2024     Lab Results   Component Value Date    HGBA1C 12.0 (A) 2025    HGBA1C 8.3 (H) 2024    HGBA1C 8.4 (H) 2024     The ASCVD Risk score (Daniel DK, et al., 2019) failed to calculate for the following reasons:    The 2019 ASCVD risk score is only valid for ages 40 to 79      Monitoring         Assessment/Plan     The patient reports today for a diabetes consultation. Reviewed CGM report and discussed with patient. TIR not at goal. CGM report suggests hyperglycemia throughout the entire day. Patient does report missing doeses for both rapid and basal insulin. Patient reports being on omnipod 5 about 1-2 years ago. States he lost insurance coverage and was not able to continue the pump. He would benefit from insulin pump therapy and AID software. Currently carb counts and is using a dexcom CGM which is compatible. Will look into insurance coverage.         PATIENT EDUCATION/GOALS    Goals  Fasting B - 130 mg/dL  Postprandial BG: less than 180 mg/dL  A1c: less than 7%      Provided counseling on lifestyle modifications, medications, and self-monitoring. Patient has no additional questions at this time. Pharmacy to follow up in 2-3 weeks. Please reach out with any questions. Thank you.       Jacinto Quiroz  PharmD    Continue all meds under the continuation of care with the referring provider and clinical pharmacy team.

## 2025-07-31 DIAGNOSIS — E10.65 TYPE 1 DIABETES MELLITUS WITH HYPERGLYCEMIA (MULTI): ICD-10-CM

## 2025-08-01 RX ORDER — INSULIN LISPRO 100 [IU]/ML
INJECTION, SOLUTION INTRAVENOUS; SUBCUTANEOUS
Qty: 45 ML | Refills: 0 | Status: SHIPPED | OUTPATIENT
Start: 2025-08-01

## 2025-08-20 ENCOUNTER — APPOINTMENT (OUTPATIENT)
Dept: ENDOCRINOLOGY | Facility: CLINIC | Age: 25
End: 2025-08-20
Payer: COMMERCIAL

## 2025-08-20 ASSESSMENT — PATIENT HEALTH QUESTIONNAIRE - PHQ9
SUM OF ALL RESPONSES TO PHQ9 QUESTIONS 1 & 2: 0
1. LITTLE INTEREST OR PLEASURE IN DOING THINGS: NOT AT ALL
2. FEELING DOWN, DEPRESSED OR HOPELESS: NOT AT ALL

## 2025-08-20 ASSESSMENT — PAIN SCALES - GENERAL: PAINLEVEL_OUTOF10: 0-NO PAIN

## 2025-08-21 DIAGNOSIS — R80.9 TYPE 1 DIABETES MELLITUS WITH DIABETIC MICROALBUMINURIA (MULTI): Primary | ICD-10-CM

## 2025-08-21 DIAGNOSIS — E10.29 TYPE 1 DIABETES MELLITUS WITH DIABETIC MICROALBUMINURIA (MULTI): Primary | ICD-10-CM

## 2025-09-05 DIAGNOSIS — R80.9 TYPE 1 DIABETES MELLITUS WITH DIABETIC MICROALBUMINURIA (MULTI): Primary | ICD-10-CM

## 2025-09-05 DIAGNOSIS — E10.29 TYPE 1 DIABETES MELLITUS WITH DIABETIC MICROALBUMINURIA (MULTI): Primary | ICD-10-CM

## 2025-09-05 RX ORDER — INSULIN PMP CART,AUT,G6/7,CNTR
1 EACH SUBCUTANEOUS
Qty: 30 EACH | Refills: 1 | Status: SHIPPED | OUTPATIENT
Start: 2025-09-05

## 2025-09-05 RX ORDER — INSULIN PMP CART,AUT,G6/7,CNTR
1 EACH SUBCUTANEOUS
Qty: 1 EACH | Refills: 0 | Status: SHIPPED | OUTPATIENT
Start: 2025-09-05

## 2025-09-24 ENCOUNTER — APPOINTMENT (OUTPATIENT)
Dept: PHARMACY | Facility: HOSPITAL | Age: 25
End: 2025-09-24
Payer: COMMERCIAL

## 2026-03-18 ENCOUNTER — APPOINTMENT (OUTPATIENT)
Dept: ENDOCRINOLOGY | Facility: CLINIC | Age: 26
End: 2026-03-18
Payer: COMMERCIAL